# Patient Record
Sex: MALE | Race: WHITE | NOT HISPANIC OR LATINO | Employment: FULL TIME | RURAL
[De-identification: names, ages, dates, MRNs, and addresses within clinical notes are randomized per-mention and may not be internally consistent; named-entity substitution may affect disease eponyms.]

---

## 2021-04-07 ENCOUNTER — HISTORICAL (OUTPATIENT)
Dept: ADMINISTRATIVE | Facility: HOSPITAL | Age: 21
End: 2021-04-07

## 2021-04-07 LAB
ANION GAP SERPL CALCULATED.3IONS-SCNC: 16 MMOL/L
BASOPHILS # BLD AUTO: 0.02 X10E3/UL (ref 0–0.2)
BASOPHILS NFR BLD AUTO: 0.4 % (ref 0–1)
BUN SERPL-MCNC: 13 MG/DL (ref 7–18)
CALCIUM SERPL-MCNC: 9.9 MG/DL (ref 8.5–10.1)
CHLORIDE SERPL-SCNC: 105 MMOL/L (ref 101–111)
CO2 SERPL-SCNC: 25 MMOL/L (ref 21–32)
CREAT SERPL-MCNC: 1 MG/DL (ref 0.6–1.3)
EOSINOPHIL # BLD AUTO: 0.15 X10E3/UL (ref 0–0.5)
EOSINOPHIL NFR BLD AUTO: 2.7 % (ref 1–4)
ERYTHROCYTE [DISTWIDTH] IN BLOOD BY AUTOMATED COUNT: 13 % (ref 11.5–14.5)
GLUCOSE SERPL-MCNC: 111 MG/DL (ref 74–106)
HCT VFR BLD AUTO: 42.3 % (ref 40–54)
HGB BLD-MCNC: 14.7 G/DL (ref 13.5–18)
LYMPHOCYTES # BLD AUTO: 1.94 X10E3/UL (ref 1–4.8)
LYMPHOCYTES NFR BLD AUTO: 34.9 % (ref 27–41)
MCH RBC QN AUTO: 30.6 PG (ref 27–31)
MCHC RBC AUTO-ENTMCNC: 34.8 G/DL (ref 32–36)
MCV RBC AUTO: 88 FL (ref 80–96)
MONOCYTES # BLD AUTO: 0.59 X10E3/UL (ref 0–0.8)
MONOCYTES NFR BLD AUTO: 10.6 % (ref 2–6)
MPC BLD CALC-MCNC: 12.1 FL (ref 9.4–12.4)
NEUTROPHILS # BLD AUTO: 2.86 X10E3/UL (ref 1.8–7.7)
NEUTROPHILS NFR BLD AUTO: 51.4 % (ref 53–65)
PLATELET # BLD AUTO: 189 X10E3/UL (ref 150–400)
POTASSIUM SERPL-SCNC: 4.2 MMOL/L (ref 3.6–5)
RBC # BLD AUTO: 4.81 X10E6/UL (ref 4.6–6.2)
SODIUM SERPL-SCNC: 142 MMOL/L (ref 135–145)
WBC # BLD AUTO: 5.56 X10E3/UL (ref 4.5–11)

## 2022-08-15 ENCOUNTER — OFFICE VISIT (OUTPATIENT)
Dept: FAMILY MEDICINE | Facility: CLINIC | Age: 22
End: 2022-08-15

## 2022-08-15 VITALS
SYSTOLIC BLOOD PRESSURE: 112 MMHG | TEMPERATURE: 98 F | DIASTOLIC BLOOD PRESSURE: 86 MMHG | WEIGHT: 194.81 LBS | HEIGHT: 69 IN | OXYGEN SATURATION: 98 % | RESPIRATION RATE: 18 BRPM | HEART RATE: 76 BPM | BODY MASS INDEX: 28.85 KG/M2

## 2022-08-15 DIAGNOSIS — Z20.822 ENCOUNTER FOR LABORATORY TESTING FOR COVID-19 VIRUS: ICD-10-CM

## 2022-08-15 DIAGNOSIS — J02.9 PHARYNGITIS, UNSPECIFIED ETIOLOGY: ICD-10-CM

## 2022-08-15 DIAGNOSIS — J01.40 ACUTE NON-RECURRENT PANSINUSITIS: Primary | ICD-10-CM

## 2022-08-15 LAB
CTP QC/QA: YES
SARS-COV-2 AG RESP QL IA.RAPID: NEGATIVE

## 2022-08-15 PROCEDURE — 87426 SARS CORONAVIRUS 2 ANTIGEN POCT: ICD-10-PCS | Mod: QW,,, | Performed by: NURSE PRACTITIONER

## 2022-08-15 PROCEDURE — 87426 SARSCOV CORONAVIRUS AG IA: CPT | Mod: QW,,, | Performed by: NURSE PRACTITIONER

## 2022-08-15 PROCEDURE — 96372 THER/PROPH/DIAG INJ SC/IM: CPT | Mod: ,,, | Performed by: NURSE PRACTITIONER

## 2022-08-15 PROCEDURE — 96372 PR INJECTION,THERAP/PROPH/DIAG2ST, IM OR SUBCUT: ICD-10-PCS | Mod: ,,, | Performed by: NURSE PRACTITIONER

## 2022-08-15 PROCEDURE — 99203 PR OFFICE/OUTPT VISIT, NEW, LEVL III, 30-44 MIN: ICD-10-PCS | Mod: 25,,, | Performed by: NURSE PRACTITIONER

## 2022-08-15 PROCEDURE — 99203 OFFICE O/P NEW LOW 30 MIN: CPT | Mod: 25,,, | Performed by: NURSE PRACTITIONER

## 2022-08-15 RX ORDER — CEFTRIAXONE 1 G/1
1 INJECTION, POWDER, FOR SOLUTION INTRAMUSCULAR; INTRAVENOUS
Status: COMPLETED | OUTPATIENT
Start: 2022-08-15 | End: 2022-08-15

## 2022-08-15 RX ORDER — AZITHROMYCIN 250 MG/1
TABLET, FILM COATED ORAL
Qty: 6 TABLET | Refills: 0 | Status: SHIPPED | OUTPATIENT
Start: 2022-08-15 | End: 2022-08-20

## 2022-08-15 RX ORDER — DEXAMETHASONE SODIUM PHOSPHATE 4 MG/ML
4 INJECTION, SOLUTION INTRA-ARTICULAR; INTRALESIONAL; INTRAMUSCULAR; INTRAVENOUS; SOFT TISSUE
Status: COMPLETED | OUTPATIENT
Start: 2022-08-15 | End: 2022-08-15

## 2022-08-15 RX ADMIN — CEFTRIAXONE 1 G: 1 INJECTION, POWDER, FOR SOLUTION INTRAMUSCULAR; INTRAVENOUS at 04:08

## 2022-08-15 RX ADMIN — DEXAMETHASONE SODIUM PHOSPHATE 4 MG: 4 INJECTION, SOLUTION INTRA-ARTICULAR; INTRALESIONAL; INTRAMUSCULAR; INTRAVENOUS; SOFT TISSUE at 04:08

## 2022-08-15 NOTE — ASSESSMENT & PLAN NOTE
COVID/Flu negative. Rocephin and Decadron given IM in clinic.  Zithromax as ordered and Ed a hist as needed.     Reviewed pathology of current symptoms, medication side effects/risk/benefits/directions on taking medications, and worsening or persistent symptoms that require follow up in next 2-3 days. Saline/steroid nasal sprays, antihistamine use, increase fluid intake, and multivitamin/elderberry/Zinc use were recommended. May take Tylenol or Motrin as needed for pain and/or fever. Patient was instructed to take antibiotic as directed, complete entire course to avoid antibiotic resistance, and take OTC probiotic with antibiotic to prevent GI upset. Patient verbalized understanding of treatment plan and denies any questions.

## 2022-08-15 NOTE — PROGRESS NOTES
Crhista Lawler NP   [facility]  [address]  [place]  [address]      PATIENT NAME: Arnie Scott  : 2000  DATE: 8/15/22  MRN: [id number]      Billing Provider: Christa Lawler NP  Level of Service:   Patient PCP Information     Provider PCP Type    Primary Doctor No General          Reason for Visit / Chief Complaint: Headache (Since Thursday), Sore Throat (On and off), and Shortness of Breath (Feels hard to breathe sometimes. Has a lot of pressure on his chest. )       Update PCP  Update Chief Complaint         History of Present Illness / Problem Focused Workflow     Arnie Scott presents to the clinic with Headache (Since Thursday), Sore Throat (On and off), and Shortness of Breath (Feels hard to breathe sometimes. Has a lot of pressure on his chest. )      22 year old male presents to clinic with complaints of headache, sore throat, and what he describes as feeling tight in his chest when he takes a breath which started Thursday. He is accompanied today by his mother. He denies fever and denies known sick contacts. He reports he left work early  due to he was feeling so bad and went to [facility] ER. He was diagnosed with sinusitis and instructed to follow up here for treatment. He states since this time his chest feels better but his head still hurts and his throat is sore.       Review of Systems     @Review of Systems   Constitutional: Negative for activity change, appetite change, fatigue and fever.   HENT: Positive for nasal congestion, sinus pressure/congestion and sore throat. Negative for ear pain and rhinorrhea.    Eyes: Negative for pain, redness, visual disturbance and eye dryness.   Respiratory: Positive for shortness of breath. Negative for cough.    Cardiovascular: Negative for chest pain and leg swelling.   Gastrointestinal: Negative for abdominal distention, abdominal pain, constipation and diarrhea.   Endocrine: Negative for cold intolerance,  heat intolerance and polyuria.   Genitourinary: Negative for bladder incontinence, dysuria, frequency and urgency.   Musculoskeletal: Negative for arthralgias, gait problem and myalgias.   Integumentary:  Negative for color change, rash and wound.   Allergic/Immunologic: Negative for environmental allergies and food allergies.   Neurological: Positive for headaches. Negative for dizziness, weakness and light-headedness.   Psychiatric/Behavioral: Negative for behavioral problems and sleep disturbance.       Medical / Social / Family History     Past Medical History:   Diagnosis Date    ADHD (attention deficit hyperactivity disorder)        Past Surgical History:   Procedure Laterality Date    ABDOMINAL SURGERY      TYMPANOSTOMY TUBE PLACEMENT Bilateral        Social History    reports that he has been smoking cigarettes. He quit smokeless tobacco use about 2 weeks ago.  His smokeless tobacco use included snuff. He reports current alcohol use. He reports that he does not use drugs.    Family History  's family history includes Arthritis in his mother; Asthma in his maternal grandmother; Diabetes in his maternal grandmother; Heart attack in his maternal grandfather; Hyperlipidemia in his father; Hypertension in his maternal grandfather, maternal grandmother, and mother; Liver disease in his mother; No Known Problems in his brother, paternal grandfather, paternal grandmother, and sister; Stroke in his maternal grandmother.    Medications and Allergies     Medications  No outpatient medications have been marked as taking for the 8/15/22 encounter (Office Visit) with Christa Lawler NP.     Current Facility-Administered Medications for the 8/15/22 encounter (Office Visit) with Christa Lawler NP   Medication Dose Route Frequency Provider Last Rate Last Admin    cefTRIAXone injection 1 g  1 g Intramuscular 1 time in Clinic/HOD Christa Lawler NP        dexamethasone injection 4 mg  4 mg Intramuscular 1  time in Clinic/HOD Christa Lawler NP           Allergies  Review of patient's allergies indicates:  No Known Allergies    Physical Examination     Vitals:    08/15/22 1522   BP: 112/86   Pulse: 76   Resp: 18   Temp: 98.3 °F (36.8 °C)     Physical Exam  Vitals and nursing note reviewed.   HENT:      Head: Normocephalic.      Right Ear: Tympanic membrane is bulging.      Left Ear: Tympanic membrane is bulging.      Nose: Congestion and rhinorrhea present. Rhinorrhea is purulent.      Right Sinus: Maxillary sinus tenderness and frontal sinus tenderness present.      Left Sinus: Maxillary sinus tenderness and frontal sinus tenderness present.      Mouth/Throat:      Mouth: Mucous membranes are moist.      Pharynx: Oropharynx is clear. Posterior oropharyngeal erythema present. No oropharyngeal exudate.   Eyes:      Conjunctiva/sclera: Conjunctivae normal.   Cardiovascular:      Rate and Rhythm: Normal rate and regular rhythm.      Heart sounds: Normal heart sounds.   Pulmonary:      Effort: Pulmonary effort is normal.      Breath sounds: Normal breath sounds. No wheezing, rhonchi or rales.   Abdominal:      General: Abdomen is flat. Bowel sounds are normal. There is no distension.      Palpations: Abdomen is soft.   Musculoskeletal:         General: No swelling or tenderness. Normal range of motion.      Cervical back: Normal range of motion.      Right lower leg: No edema.      Left lower leg: No edema.   Skin:     General: Skin is warm and dry.   Neurological:      Mental Status: He is alert. Mental status is at baseline.   Psychiatric:         Mood and Affect: Mood normal.         Behavior: Behavior normal.               Lab Results   Component Value Date    WBC 5.56 04/07/2021    HGB 14.7 04/07/2021    HCT 42.3 04/07/2021    MCV 88 04/07/2021     04/07/2021          Sodium   Date Value Ref Range Status   04/07/2021 142.0 135.0 - 145.0 mmol/L      Potassium   Date Value Ref Range Status   04/07/2021 4.2 3.6  - 5.0 mmol/L      Chloride   Date Value Ref Range Status   04/07/2021 105.0 101.0 - 111.0 mmol/L      CO2   Date Value Ref Range Status   04/07/2021 25 21 - 32 mmol/L      Glucose   Date Value Ref Range Status   04/07/2021 111 (H) 74 - 106 mg/dL      BUN   Date Value Ref Range Status   04/07/2021 13 7 - 18 mg/dL      Creatinine   Date Value Ref Range Status   04/07/2021 1.0 0.6 - 1.3 mg/dL      Calcium   Date Value Ref Range Status   04/07/2021 9.9 8.5 - 10.1 mg/dL      Anion Gap   Date Value Ref Range Status   04/07/2021 16       eGFR    Date Value Ref Range Status   04/07/2021 120       Comment:     The above 11 analytes were performed by Darrian Terry[address] [place] [id number]     eGFR   Date Value Ref Range Status   04/07/2021 99        CT Cervical Spine Without Contrast  Narrative: CT cervical spine    Indication: Neck pain after injury     Comparison: None available    Technique: Axial CT imaging of the cervical spine is performed without  contrast.  Computer reformatting is viewed in the sagittal and coronal  planes.    CT dose reduction technique used - Dose Rite and tube current  modulation.    Findings: No fracture is seen. 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    Vertebral body heights are otherwise within normal limits.  No other  abnormality is demonstrated.  Impression: Impression: No evidence of acute injury demonstrated    This report has been electronically signed by Roger Fry  X-Ray Hand 3 view Right  Narrative: XR HAND RIGHT COMPLETE 3 views     Indication: Pain, history of injury     Comparison: None available    Findings: No evidence of fracture seen.  The alignment of the joints  appears normal.  No degenerative change is present.  No soft tissue  abnormality is seen.  Impression: Impression: No evidence of abnormality  demonstrated    This report has been electronically signed by Roger Fry  CT Head Without Contrast  Narrative: CT HEAD WO CONTRAST    Indication: Head injury, vomiting    Comparison: None available    Technique: Axial CT imaging of the brain is performed without contrast  with 3 mm increments.    CT dose reduction technique used - Dose Rite and tube current  modulation.    Findings: No evidence of hemorrhage, mass, mass effect, midline shift or  acute infarct seen.  The brain parenchyma attenuation and  differentiation appears within normal limits. The ventricles and  cisterns are normal in caliber.  No cranial or skull base abnormality is  identified.    Impression: Impression: No evidence of acute demonstrated.    This report has been electronically signed by Roger Fry     Procedures   Assessment and Plan (including Health Maintenance)      Problem List  Smart Sets  Document Outside HM   :    Plan:           Problem List Items Addressed This Visit        ENT    Acute non-recurrent pansinusitis - Primary    Current Assessment & Plan     COVID/Flu negative. Rocephin and Decadron given IM in clinic.  Zithromax as ordered and Ed a hist as needed.     Reviewed pathology of current symptoms, medication side effects/risk/benefits/directions on taking medications, and worsening or persistent symptoms that require follow up in next 2-3 days. Saline/steroid nasal sprays, antihistamine use, increase fluid intake, and multivitamin/elderberry/Zinc use were recommended. May take Tylenol or Motrin as needed for pain and/or fever. Patient was instructed to take antibiotic as directed, complete entire course to avoid antibiotic resistance, and take OTC probiotic with antibiotic to prevent GI upset. Patient verbalized understanding of treatment plan and denies any questions.             Relevant Orders    SARS Coronavirus 2 Antigen, POCT    Pharyngitis    Current Assessment & Plan     Most likely related to post nasal  drip and will improve with sinusitis.     Symptomatic treatment is recommended. Increase fluids and rest, alternate OTC Tylenol/Motrin for fever/pain, and to follow up if discussed worsening symptoms occur in next 2-3 days for further evaluation. May use warm, salt gargles, honey lemon drops, etc as needed. Patient verbalized understanding of treatment plan and denies any questions.                     Relevant Orders    SARS Coronavirus 2 Antigen, POCT      Other Visit Diagnoses     Encounter for laboratory testing for COVID-19 virus        Relevant Orders    SARS Coronavirus 2 Antigen, POCT          Health Maintenance Topics with due status: Not Due       Topic Last Completion Date    TETANUS VACCINE 09/19/2013    Influenza Vaccine Not Due       No future appointments.     Health Maintenance Due   Topic Date Due    Hepatitis C Screening  Never done    Lipid Panel  Never done    HPV Vaccines (1 - Male 2-dose series) Never done    HIV Screening  Never done        Follow up if symptoms worsen or fail to improve.     Signature:  Christa Lawler NP  [facility]  [address] [address]  [address]    Date of encounter: 8/15/22

## 2022-08-15 NOTE — ASSESSMENT & PLAN NOTE
Most likely related to post nasal drip and will improve with sinusitis.     Symptomatic treatment is recommended. Increase fluids and rest, alternate OTC Tylenol/Motrin for fever/pain, and to follow up if discussed worsening symptoms occur in next 2-3 days for further evaluation. May use warm, salt gargles, honey lemon drops, etc as needed. Patient verbalized understanding of treatment plan and denies any questions.

## 2022-10-18 ENCOUNTER — OFFICE VISIT (OUTPATIENT)
Dept: FAMILY MEDICINE | Facility: CLINIC | Age: 22
End: 2022-10-18

## 2022-10-18 VITALS
RESPIRATION RATE: 18 BRPM | OXYGEN SATURATION: 99 % | SYSTOLIC BLOOD PRESSURE: 106 MMHG | DIASTOLIC BLOOD PRESSURE: 70 MMHG | WEIGHT: 198.81 LBS | HEART RATE: 63 BPM | HEIGHT: 69 IN | BODY MASS INDEX: 29.45 KG/M2 | TEMPERATURE: 97 F

## 2022-10-18 DIAGNOSIS — R53.83 FATIGUE, UNSPECIFIED TYPE: Primary | ICD-10-CM

## 2022-10-18 LAB
ALBUMIN SERPL BCP-MCNC: 4.3 G/DL (ref 3.5–5)
ALBUMIN/GLOB SERPL: 1.3 {RATIO}
ALP SERPL-CCNC: 68 U/L (ref 45–115)
ALT SERPL W P-5'-P-CCNC: 58 U/L (ref 16–61)
ANION GAP SERPL CALCULATED.3IONS-SCNC: 11 MMOL/L (ref 7–16)
AST SERPL W P-5'-P-CCNC: 27 U/L (ref 15–37)
BASOPHILS # BLD AUTO: 0.02 K/UL (ref 0–0.2)
BASOPHILS NFR BLD AUTO: 0.5 % (ref 0–1)
BILIRUB SERPL-MCNC: 0.4 MG/DL (ref ?–1.2)
BUN SERPL-MCNC: 15 MG/DL (ref 7–18)
BUN/CREAT SERPL: 16 (ref 6–20)
CALCIUM SERPL-MCNC: 9.3 MG/DL (ref 8.5–10.1)
CHLORIDE SERPL-SCNC: 104 MMOL/L (ref 98–107)
CO2 SERPL-SCNC: 26 MMOL/L (ref 21–32)
CREAT SERPL-MCNC: 0.92 MG/DL (ref 0.7–1.3)
DIFFERENTIAL METHOD BLD: ABNORMAL
EGFR (NO RACE VARIABLE) (RUSH/TITUS): 120 ML/MIN/1.73M²
EOSINOPHIL # BLD AUTO: 0.15 K/UL (ref 0–0.5)
EOSINOPHIL NFR BLD AUTO: 3.6 % (ref 1–4)
ERYTHROCYTE [DISTWIDTH] IN BLOOD BY AUTOMATED COUNT: 12.7 % (ref 11.5–14.5)
GLOBULIN SER-MCNC: 3.3 G/DL (ref 2–4)
GLUCOSE SERPL-MCNC: 103 MG/DL (ref 74–106)
HCT VFR BLD AUTO: 42.8 % (ref 40–54)
HGB BLD-MCNC: 14.9 G/DL (ref 13.5–18)
IMM GRANULOCYTES # BLD AUTO: 0.03 K/UL (ref 0–0.04)
IMM GRANULOCYTES NFR BLD: 0.7 % (ref 0–0.4)
LYMPHOCYTES # BLD AUTO: 1.62 K/UL (ref 1–4.8)
LYMPHOCYTES NFR BLD AUTO: 38.8 % (ref 27–41)
MCH RBC QN AUTO: 30.7 PG (ref 27–31)
MCHC RBC AUTO-ENTMCNC: 34.8 G/DL (ref 32–36)
MCV RBC AUTO: 88.2 FL (ref 80–96)
MONOCYTES # BLD AUTO: 0.44 K/UL (ref 0–0.8)
MONOCYTES NFR BLD AUTO: 10.6 % (ref 2–6)
MPC BLD CALC-MCNC: 12.5 FL (ref 9.4–12.4)
NEUTROPHILS # BLD AUTO: 1.91 K/UL (ref 1.8–7.7)
NEUTROPHILS NFR BLD AUTO: 45.8 % (ref 53–65)
NRBC # BLD AUTO: 0 X10E3/UL
NRBC, AUTO (.00): 0 %
PLATELET # BLD AUTO: 177 K/UL (ref 150–400)
POTASSIUM SERPL-SCNC: 4.4 MMOL/L (ref 3.5–5.1)
PROT SERPL-MCNC: 7.6 G/DL (ref 6.4–8.2)
RBC # BLD AUTO: 4.85 M/UL (ref 4.6–6.2)
SODIUM SERPL-SCNC: 137 MMOL/L (ref 136–145)
WBC # BLD AUTO: 4.17 K/UL (ref 4.5–11)

## 2022-10-18 PROCEDURE — 85025 COMPLETE CBC W/AUTO DIFF WBC: CPT | Mod: ,,, | Performed by: CLINICAL MEDICAL LABORATORY

## 2022-10-18 PROCEDURE — 99213 OFFICE O/P EST LOW 20 MIN: CPT | Mod: ,,, | Performed by: NURSE PRACTITIONER

## 2022-10-18 PROCEDURE — 85025 CBC WITH DIFFERENTIAL: ICD-10-PCS | Mod: ,,, | Performed by: CLINICAL MEDICAL LABORATORY

## 2022-10-18 PROCEDURE — 80053 COMPREHEN METABOLIC PANEL: CPT | Mod: ,,, | Performed by: CLINICAL MEDICAL LABORATORY

## 2022-10-18 PROCEDURE — 80053 COMPREHENSIVE METABOLIC PANEL: ICD-10-PCS | Mod: ,,, | Performed by: CLINICAL MEDICAL LABORATORY

## 2022-10-18 PROCEDURE — 99213 PR OFFICE/OUTPT VISIT, EST, LEVL III, 20-29 MIN: ICD-10-PCS | Mod: ,,, | Performed by: NURSE PRACTITIONER

## 2022-10-18 NOTE — LETTER
October 18, 2022      [facility]  [address]  [place] [address]  Phone: [phone]  Fax: [phone]       Patient: Arnie Scott   YOB: 2000  Date of Visit: 10/18/2022    To Whom It May Concern:    Ze Scott  was at [facility] on 10/18/2022. The patient may return to work/school on 10/18/22 with no restrictions. Please excuse work absence on 10/17/22. If you have any questions or concerns, or if I can be of further assistance, please do not hesitate to contact me.    Sincerely,    Christa Lawler NP

## 2022-10-18 NOTE — PROGRESS NOTES
Christa Lawler NP   [facility]  [address]  [place]  [address]      PATIENT NAME: Arnie Scott  : 2000  DATE: 10/18/22  MRN: [id number]      Billing Provider: Christa Lawler NP  Level of Service:   Patient PCP Information       Provider PCP Type    Primary Doctor No General            Reason for Visit / Chief Complaint: Shortness of Breath (Pt states he has for about a month had shortness of breath, easy to fatigue, states he has just not felt good for about a month now )       Update PCP  Update Chief Complaint         History of Present Illness / Problem Focused Workflow     Arnie Scott presents to the clinic with Shortness of Breath (Pt states he has for about a month had shortness of breath, easy to fatigue, states he has just not felt good for about a month now )     22 year old male presents to clinic with complaints of shortness of breath. Patient states for about a month he has had shortness of breath, easy to fatigue, states he has just not felt good for about a month now. Wants some lab work done to see if we can figure out what is going on.         Review of Systems     @Review of Systems   Constitutional:  Positive for fatigue. Negative for activity change, appetite change and fever.   HENT:  Negative for nasal congestion, ear pain, rhinorrhea, sinus pressure/congestion and sore throat.    Eyes:  Negative for pain, redness, visual disturbance and eye dryness.   Respiratory:  Positive for shortness of breath. Negative for cough.    Cardiovascular:  Negative for chest pain and leg swelling.   Gastrointestinal:  Negative for abdominal distention, abdominal pain, constipation and diarrhea.   Endocrine: Negative for cold intolerance, heat intolerance and polyuria.   Genitourinary:  Negative for bladder incontinence, dysuria, frequency and urgency.   Musculoskeletal:  Negative for arthralgias, gait problem and myalgias.   Integumentary:  Negative for color change, rash  and wound.   Allergic/Immunologic: Negative for environmental allergies and food allergies.   Neurological:  Negative for dizziness, weakness, light-headedness and headaches.   Psychiatric/Behavioral:  Negative for behavioral problems and sleep disturbance.      Medical / Social / Family History     Past Medical History:   Diagnosis Date    ADHD (attention deficit hyperactivity disorder)        Past Surgical History:   Procedure Laterality Date    ABDOMINAL SURGERY      TYMPANOSTOMY TUBE PLACEMENT Bilateral        Social History    reports that he has quit smoking. His smoking use included cigarettes. He quit smokeless tobacco use about 2 months ago.  His smokeless tobacco use included snuff. He reports current alcohol use. He reports that he does not use drugs.    Family History  's family history includes Arthritis in his mother; Asthma in his maternal grandmother; Diabetes in his maternal grandmother; Heart attack in his maternal grandfather; Hyperlipidemia in his father; Hypertension in his maternal grandfather, maternal grandmother, and mother; Liver disease in his mother; No Known Problems in his brother, paternal grandfather, paternal grandmother, and sister; Stroke in his maternal grandmother.    Medications and Allergies     Medications  No outpatient medications have been marked as taking for the 10/18/22 encounter (Office Visit) with Christa Lawler NP.       Allergies  Review of patient's allergies indicates:  No Known Allergies    Physical Examination     Vitals:    10/18/22 0952   BP: 106/70   Pulse: 63   Resp: 18   Temp: 96.9 °F (36.1 °C)     Physical Exam  Vitals and nursing note reviewed.   HENT:      Head: Normocephalic.      Right Ear: Tympanic membrane normal.      Left Ear: Tympanic membrane normal.      Nose: Nose normal.      Mouth/Throat:      Mouth: Mucous membranes are moist.      Pharynx: Oropharynx is clear. No posterior oropharyngeal erythema.   Eyes:      Conjunctiva/sclera:  Conjunctivae normal.   Cardiovascular:      Rate and Rhythm: Normal rate and regular rhythm.      Heart sounds: Normal heart sounds.   Pulmonary:      Effort: Pulmonary effort is normal.      Breath sounds: Normal breath sounds.   Abdominal:      General: Abdomen is flat. Bowel sounds are normal. There is no distension.      Palpations: Abdomen is soft.   Musculoskeletal:         General: No swelling or tenderness. Normal range of motion.      Cervical back: Normal range of motion.      Right lower leg: No edema.      Left lower leg: No edema.   Skin:     General: Skin is warm and dry.   Neurological:      Mental Status: He is alert. Mental status is at baseline.   Psychiatric:         Mood and Affect: Mood normal.         Behavior: Behavior normal.             Lab Results   Component Value Date    WBC 4.17 (L) 10/18/2022    HGB 14.9 10/18/2022    HCT 42.8 10/18/2022    MCV 88.2 10/18/2022     10/18/2022          Sodium   Date Value Ref Range Status   10/18/2022 137 136 - 145 mmol/L Final     Potassium   Date Value Ref Range Status   10/18/2022 4.4 3.5 - 5.1 mmol/L Final     Chloride   Date Value Ref Range Status   10/18/2022 104 98 - 107 mmol/L Final     CO2   Date Value Ref Range Status   10/18/2022 26 21 - 32 mmol/L Final     Glucose   Date Value Ref Range Status   10/18/2022 103 74 - 106 mg/dL Final     BUN   Date Value Ref Range Status   10/18/2022 15 7 - 18 mg/dL Final     Creatinine   Date Value Ref Range Status   10/18/2022 0.92 0.70 - 1.30 mg/dL Final     Calcium   Date Value Ref Range Status   10/18/2022 9.3 8.5 - 10.1 mg/dL Final     Total Protein   Date Value Ref Range Status   10/18/2022 7.6 6.4 - 8.2 g/dL Final     Albumin   Date Value Ref Range Status   10/18/2022 4.3 3.5 - 5.0 g/dL Final     Bilirubin, Total   Date Value Ref Range Status   10/18/2022 0.4 >0.0 - 1.2 mg/dL Final     Alk Phos   Date Value Ref Range Status   10/18/2022 68 45 - 115 U/L Final     AST   Date Value Ref Range Status    10/18/2022 27 15 - 37 U/L Final     ALT   Date Value Ref Range Status   10/18/2022 58 16 - 61 U/L Final     Anion Gap   Date Value Ref Range Status   10/18/2022 11 7 - 16 mmol/L Final     eGFR    Date Value Ref Range Status   04/07/2021 120       Comment:     The above 11 analytes were performed by Darrian [id number] [place] [address]     eGFR   Date Value Ref Range Status   04/07/2021 99        CT Cervical Spine Without Contrast  Narrative: CT cervical spine    Indication: Neck pain after injury     Comparison: None available    Technique: Axial CT imaging of the cervical spine is performed without  contrast.  Computer reformatting is viewed in the sagittal and coronal  planes.    CT dose reduction technique used - Dose Rite and tube current  modulation.    Findings: No fracture is seen. 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    Vertebral body heights are otherwise within normal limits.  No other  abnormality is demonstrated.  Impression: Impression: No evidence of acute injury demonstrated    This report has been electronically signed by Roger Fry  X-Ray Hand 3 view Right  Narrative: XR HAND RIGHT COMPLETE 3 views     Indication: Pain, history of injury     Comparison: None available    Findings: No evidence of fracture seen.  The alignment of the joints  appears normal.  No degenerative change is present.  No soft tissue  abnormality is seen.  Impression: Impression: No evidence of abnormality demonstrated    This report has been electronically signed by Roger Fry  CT Head Without Contrast  Narrative: CT HEAD WO CONTRAST    Indication: Head injury, vomiting    Comparison: None available    Technique: Axial CT imaging of the brain is performed without contrast  with 3 mm increments.    CT dose reduction technique used - Dose Rite and tube  current  modulation.    Findings: No evidence of hemorrhage, mass, mass effect, midline shift or  acute infarct seen.  The brain parenchyma attenuation and  differentiation appears within normal limits. The ventricles and  cisterns are normal in caliber.  No cranial or skull base abnormality is  identified.    Impression: Impression: No evidence of acute demonstrated.    This report has been electronically signed by Roger Fry     Procedures   Assessment and Plan (including Health Maintenance)      Problem List  Smart Sets  Document Outside HM   :    Plan:           Problem List Items Addressed This Visit          Other    Fatigue - Primary    Current Assessment & Plan     CBC and CMP obtained at today's visit. Will follow up with labs.   Instructed patient to make sure he is getting plenty of sleep at night. Avoid using caffeine before bed. Turn off TV, phone and electronics while trying to rest.   Follow up as needed.           Relevant Orders    CBC Auto Differential (Completed)    Comprehensive Metabolic Panel (Completed)       Health Maintenance Topics with due status: Not Due       Topic Last Completion Date    TETANUS VACCINE 09/19/2013       Future Appointments   Date Time Provider Department Center   10/24/2022  9:30 AM Christa Lawler NP [facility]        Health Maintenance Due   Topic Date Due    Hepatitis C Screening  Never done    Lipid Panel  Never done        No follow-ups on file.     Signature:  Christa Lawler NP  [address] [place]  [address]    Date of encounter: 10/18/22

## 2022-10-19 NOTE — PROGRESS NOTES
Labs reviewed: CBC and BMP normal or clinically insignificant. If symptoms continue follow up for further diagnostic testing.

## 2022-10-23 PROBLEM — R53.83 FATIGUE: Status: ACTIVE | Noted: 2022-10-23

## 2022-10-23 NOTE — ASSESSMENT & PLAN NOTE
CBC and CMP obtained at today's visit. Will follow up with labs.   Instructed patient to make sure he is getting plenty of sleep at night. Avoid using caffeine before bed. Turn off TV, phone and electronics while trying to rest.   Follow up as needed.

## 2022-11-14 PROBLEM — J01.40 ACUTE NON-RECURRENT PANSINUSITIS: Status: RESOLVED | Noted: 2022-08-15 | Resolved: 2022-11-14

## 2022-11-29 ENCOUNTER — HOSPITAL ENCOUNTER (EMERGENCY)
Facility: HOSPITAL | Age: 22
Discharge: HOME | End: 2022-11-29
Attending: FAMILY MEDICINE

## 2022-11-29 VITALS
BODY MASS INDEX: 31.39 KG/M2 | HEIGHT: 67 IN | RESPIRATION RATE: 14 BRPM | WEIGHT: 200 LBS | TEMPERATURE: 98 F | OXYGEN SATURATION: 98 % | HEART RATE: 68 BPM | DIASTOLIC BLOOD PRESSURE: 66 MMHG | SYSTOLIC BLOOD PRESSURE: 121 MMHG

## 2022-11-29 DIAGNOSIS — R10.13 EPIGASTRIC PAIN: Primary | ICD-10-CM

## 2022-11-29 LAB
ALBUMIN SERPL BCP-MCNC: 4.4 G/DL (ref 3.5–5)
ALBUMIN/GLOB SERPL: 1.3 {RATIO}
ALP SERPL-CCNC: 63 U/L (ref 45–115)
ALT SERPL W P-5'-P-CCNC: 43 U/L (ref 16–61)
AMYLASE SERPL-CCNC: 57 U/L (ref 25–115)
ANION GAP SERPL CALCULATED.3IONS-SCNC: 9 MMOL/L (ref 7–16)
AST SERPL W P-5'-P-CCNC: 18 U/L (ref 15–37)
BACTERIA #/AREA URNS HPF: ABNORMAL /HPF
BASOPHILS # BLD AUTO: 0.03 K/UL (ref 0–0.2)
BASOPHILS NFR BLD AUTO: 0.7 % (ref 0–1)
BILIRUB SERPL-MCNC: 0.5 MG/DL (ref ?–1.2)
BILIRUB UR QL STRIP: NEGATIVE
BUN SERPL-MCNC: 12 MG/DL (ref 7–18)
BUN/CREAT SERPL: 13 (ref 6–20)
CALCIUM SERPL-MCNC: 9.7 MG/DL (ref 8.5–10.1)
CHLORIDE SERPL-SCNC: 103 MMOL/L (ref 98–107)
CLARITY UR: CLEAR
CO2 SERPL-SCNC: 34 MMOL/L (ref 21–32)
COLOR UR: YELLOW
CREAT SERPL-MCNC: 0.96 MG/DL (ref 0.7–1.3)
DIFFERENTIAL METHOD BLD: ABNORMAL
EGFR (NO RACE VARIABLE) (RUSH/TITUS): 114 ML/MIN/1.73M²
EOSINOPHIL # BLD AUTO: 0.1 K/UL (ref 0–0.5)
EOSINOPHIL NFR BLD AUTO: 2.3 % (ref 1–4)
ERYTHROCYTE [DISTWIDTH] IN BLOOD BY AUTOMATED COUNT: 12.5 % (ref 11.5–14.5)
GLOBULIN SER-MCNC: 3.5 G/DL (ref 2–4)
GLUCOSE SERPL-MCNC: 100 MG/DL (ref 74–106)
GLUCOSE UR STRIP-MCNC: NEGATIVE MG/DL
HCT VFR BLD AUTO: 42 % (ref 40–54)
HGB BLD-MCNC: 14.8 G/DL (ref 13.5–18)
KETONES UR STRIP-SCNC: NEGATIVE MG/DL
LEUKOCYTE ESTERASE UR QL STRIP: NEGATIVE
LIPASE SERPL-CCNC: 112 U/L (ref 73–393)
LYMPHOCYTES # BLD AUTO: 1.4 K/UL (ref 1–4.8)
LYMPHOCYTES NFR BLD AUTO: 32 % (ref 27–41)
MCH RBC QN AUTO: 30.6 PG (ref 27–31)
MCHC RBC AUTO-ENTMCNC: 35.2 G/DL (ref 32–36)
MCV RBC AUTO: 86.8 FL (ref 80–96)
MONOCYTES # BLD AUTO: 0.46 K/UL (ref 0–0.8)
MONOCYTES NFR BLD AUTO: 10.5 % (ref 2–6)
MPC BLD CALC-MCNC: 11 FL (ref 9.4–12.4)
NEUTROPHILS # BLD AUTO: 2.38 K/UL (ref 1.8–7.7)
NEUTROPHILS NFR BLD AUTO: 54.5 % (ref 53–65)
NITRITE UR QL STRIP: NEGATIVE
PH UR STRIP: 7 PH UNITS
PLATELET # BLD AUTO: 175 K/UL (ref 150–400)
POTASSIUM SERPL-SCNC: 4.3 MMOL/L (ref 3.5–5.1)
PROT SERPL-MCNC: 7.9 G/DL (ref 6.4–8.2)
PROT UR QL STRIP: 30
RBC # BLD AUTO: 4.84 M/UL (ref 4.6–6.2)
RBC # UR STRIP: NEGATIVE /UL
RBC #/AREA URNS HPF: ABNORMAL /HPF
SODIUM SERPL-SCNC: 142 MMOL/L (ref 136–145)
SP GR UR STRIP: 1.02
SQUAMOUS #/AREA URNS LPF: ABNORMAL /LPF
UROBILINOGEN UR STRIP-ACNC: 0.2 MG/DL
WBC # BLD AUTO: 4.37 K/UL (ref 4.5–11)
WBC #/AREA URNS HPF: ABNORMAL /HPF

## 2022-11-29 PROCEDURE — 36415 COLL VENOUS BLD VENIPUNCTURE: CPT | Performed by: FAMILY MEDICINE

## 2022-11-29 PROCEDURE — 99283 EMERGENCY DEPT VISIT LOW MDM: CPT | Mod: ,,, | Performed by: FAMILY MEDICINE

## 2022-11-29 PROCEDURE — 83690 ASSAY OF LIPASE: CPT | Performed by: FAMILY MEDICINE

## 2022-11-29 PROCEDURE — 85025 COMPLETE CBC W/AUTO DIFF WBC: CPT | Performed by: FAMILY MEDICINE

## 2022-11-29 PROCEDURE — 81003 URINALYSIS AUTO W/O SCOPE: CPT | Performed by: FAMILY MEDICINE

## 2022-11-29 PROCEDURE — 81001 URINALYSIS AUTO W/SCOPE: CPT | Performed by: FAMILY MEDICINE

## 2022-11-29 PROCEDURE — 80053 COMPREHEN METABOLIC PANEL: CPT | Performed by: FAMILY MEDICINE

## 2022-11-29 PROCEDURE — 25000003 PHARM REV CODE 250: Performed by: FAMILY MEDICINE

## 2022-11-29 PROCEDURE — 99284 EMERGENCY DEPT VISIT MOD MDM: CPT | Mod: 25

## 2022-11-29 PROCEDURE — 99283 PR EMERGENCY DEPT VISIT,LEVEL III: ICD-10-PCS | Mod: ,,, | Performed by: FAMILY MEDICINE

## 2022-11-29 PROCEDURE — 82150 ASSAY OF AMYLASE: CPT | Performed by: FAMILY MEDICINE

## 2022-11-29 PROCEDURE — 36000 PLACE NEEDLE IN VEIN: CPT

## 2022-11-29 RX ORDER — ONDANSETRON 4 MG/1
4 TABLET, ORALLY DISINTEGRATING ORAL
Status: COMPLETED | OUTPATIENT
Start: 2022-11-29 | End: 2022-11-29

## 2022-11-29 RX ORDER — LIDOCAINE HYDROCHLORIDE 20 MG/ML
15 SOLUTION OROPHARYNGEAL ONCE
Status: COMPLETED | OUTPATIENT
Start: 2022-11-29 | End: 2022-11-29

## 2022-11-29 RX ORDER — MAG HYDROX/ALUMINUM HYD/SIMETH 200-200-20
30 SUSPENSION, ORAL (FINAL DOSE FORM) ORAL ONCE
Status: COMPLETED | OUTPATIENT
Start: 2022-11-29 | End: 2022-11-29

## 2022-11-29 RX ADMIN — ONDANSETRON 4 MG: 4 TABLET, ORALLY DISINTEGRATING ORAL at 01:11

## 2022-11-29 RX ADMIN — LIDOCAINE HYDROCHLORIDE 15 ML: 20 SOLUTION ORAL at 02:11

## 2022-11-29 RX ADMIN — ALUMINUM HYDROXIDE, MAGNESIUM HYDROXIDE, AND SIMETHICONE 30 ML: 200; 200; 20 SUSPENSION ORAL at 02:11

## 2022-11-29 NOTE — Clinical Note
"Arnie"Thomas Scott was seen and treated in our emergency department on 11/29/2022.  He may return to work on 11/30/2022.       If you have any questions or concerns, please don't hesitate to call.      Sarina Clemons MD"

## 2022-11-29 NOTE — ED TRIAGE NOTES
22 year old male presents to ed with c/o mid upper abdominal pain x3 days that is constant and achy.   Patient states pain is worse when he is up moving around.   C/o diarrhea on 11/28/2022

## 2022-11-29 NOTE — ED PROVIDER NOTES
Encounter Date: 2022       History     Chief Complaint   Patient presents with    Abdominal Pain    Diarrhea     PT REPORTS ABDOMINAL PAIN STARTING LAST NIGHT. REPORTS HAS HAD A FEW EPISODES OF DIARRHEA. NO N/V. PASSING GAS OK. HX OF ESOPHAGEAL SURGERY AS A . NO HX OF ISSUES SINCE THEN.     The history is provided by the patient.   Review of patient's allergies indicates:  No Known Allergies  Past Medical History:   Diagnosis Date    ADHD (attention deficit hyperactivity disorder)      Past Surgical History:   Procedure Laterality Date    ABDOMINAL SURGERY      TYMPANOSTOMY TUBE PLACEMENT Bilateral      Family History   Problem Relation Age of Onset    Hypertension Mother     Liver disease Mother         fatty liver    Arthritis Mother     Hyperlipidemia Father     No Known Problems Sister     No Known Problems Brother     Diabetes Maternal Grandmother     Stroke Maternal Grandmother     Hypertension Maternal Grandmother     Asthma Maternal Grandmother     Heart attack Maternal Grandfather     Hypertension Maternal Grandfather     No Known Problems Paternal Grandmother     No Known Problems Paternal Grandfather      Social History     Tobacco Use    Smoking status: Former     Types: Cigarettes    Smokeless tobacco: Former     Types: Snuff     Quit date: 2022    Tobacco comments:     every now and then   Substance Use Topics    Alcohol use: Yes     Comment: weekends    Drug use: Never     Review of Systems   Constitutional:  Positive for fatigue.   Gastrointestinal:  Positive for abdominal pain and diarrhea. Negative for nausea and vomiting.   All other systems reviewed and are negative.    Physical Exam     Initial Vitals [22 1215]   BP Pulse Resp Temp SpO2   129/78 68 18 98.2 °F (36.8 °C) 99 %      MAP       --         Physical Exam    Constitutional: He appears well-developed and well-nourished.   HENT:   Head: Normocephalic and atraumatic.   Eyes: EOM are normal. Pupils are equal, round, and  reactive to light.   Neck: Neck supple.   Normal range of motion.  Cardiovascular:  Normal rate and regular rhythm.           Pulmonary/Chest: Breath sounds normal.   Abdominal: Abdomen is soft.   Musculoskeletal:         General: Normal range of motion.      Cervical back: Normal range of motion and neck supple.     Neurological: He is alert and oriented to person, place, and time.   Skin: Skin is warm.   Psychiatric: He has a normal mood and affect. His behavior is normal. Judgment and thought content normal.       Medical Screening Exam   See Full Note    ED Course   Procedures  Labs Reviewed   COMPREHENSIVE METABOLIC PANEL - Abnormal; Notable for the following components:       Result Value    CO2 34 (*)     All other components within normal limits   URINALYSIS, REFLEX TO URINE CULTURE - Abnormal; Notable for the following components:    Protein, UA 30 (*)     All other components within normal limits   CBC WITH DIFFERENTIAL - Abnormal; Notable for the following components:    WBC 4.37 (*)     Monocytes % 10.5 (*)     All other components within normal limits   URINALYSIS, MICROSCOPIC - Abnormal; Notable for the following components:    RBC, UA 10-15 (*)     All other components within normal limits   AMYLASE - Normal   LIPASE - Normal   CBC W/ AUTO DIFFERENTIAL    Narrative:     The following orders were created for panel order CBC auto differential.  Procedure                               Abnormality         Status                     ---------                               -----------         ------                     CBC with Differential[[id number]]        Abnormal            Final result                 Please view results for these tests on the individual orders.          Imaging Results              X-Ray KUB (Final result)  Result time 11/29/22 12:58:42      Final result by Roegr Fry II, MD (11/29/22 12:58:42)                   Impression:      No evidence of abnormality  demonstrated      Electronically signed by: Roger Fry  Date:    11/29/2022  Time:    12:58               Narrative:    EXAMINATION:  XR KUB    CLINICAL HISTORY:  Abdominal pain  abdominal pain;    COMPARISON:  None available    FINDINGS:  No free fluid or free air seen.  The bowel gas pattern appears within normal limits.  No abnormal calcifications are present.  No other abnormality is identified.                                       Medications   ondansetron disintegrating tablet 4 mg (4 mg Oral Given 11/29/22 1344)   aluminum-magnesium hydroxide-simethicone 200-200-20 mg/5 mL suspension 30 mL (30 mLs Oral Given 11/29/22 1411)     And   LIDOcaine HCl 2% oral solution 15 mL (15 mLs Oral Given 11/29/22 1411)                       Clinical Impression:   Final diagnoses:  [R10.13] Epigastric pain (Primary)      ED Disposition Condition    Discharge Stable          ED Prescriptions       Medication Sig Dispense Start Date End Date Auth. Provider    diphenhydrAMINE-aluminum-magnesium hydroxide-simethicone-LIDOcaine HCl 2% Swish and swallow 15 mLs every 6 (six) hours as needed (ABDOMINAL PAIN). 300 each 11/29/2022 12/4/2022 Sarina Clemons MD          Follow-up Information    None          Sarina Clemons MD  11/29/22 1418       Sarina Clemons MD  11/29/22 9987

## 2022-11-30 ENCOUNTER — TELEPHONE (OUTPATIENT)
Dept: EMERGENCY MEDICINE | Facility: HOSPITAL | Age: 22
End: 2022-11-30

## 2023-04-11 ENCOUNTER — LAB VISIT (OUTPATIENT)
Dept: PRIMARY CARE CLINIC | Facility: CLINIC | Age: 23
End: 2023-04-11

## 2023-04-11 DIAGNOSIS — Z02.83 ENCOUNTER FOR DRUG SCREENING: Primary | ICD-10-CM

## 2023-04-11 PROCEDURE — 99000 SPECIMEN HANDLING OFFICE-LAB: CPT | Mod: ,,, | Performed by: NURSE PRACTITIONER

## 2023-04-11 PROCEDURE — 99000 PR SPECIMEN HANDLING,DR OFF->LAB: ICD-10-PCS | Mod: ,,, | Performed by: NURSE PRACTITIONER

## 2023-04-11 NOTE — PROGRESS NOTES
Subjective     Patient ID: Arnie Scott is a 23 y.o. male.    Chief Complaint: No chief complaint on file.    HPI  Review of Systems       Objective     Physical Exam       Assessment and Plan     Problem List Items Addressed This Visit    None  Visit Diagnoses       Encounter for drug screening    -  Primary            Drug testing only

## 2023-05-20 ENCOUNTER — OFFICE VISIT (OUTPATIENT)
Dept: FAMILY MEDICINE | Facility: CLINIC | Age: 23
End: 2023-05-20

## 2023-05-20 VITALS
WEIGHT: 200 LBS | OXYGEN SATURATION: 98 % | HEIGHT: 67 IN | RESPIRATION RATE: 17 BRPM | HEART RATE: 73 BPM | TEMPERATURE: 98 F | SYSTOLIC BLOOD PRESSURE: 124 MMHG | DIASTOLIC BLOOD PRESSURE: 78 MMHG | BODY MASS INDEX: 31.39 KG/M2

## 2023-05-20 DIAGNOSIS — J03.80 ACUTE BACTERIAL TONSILLITIS: Primary | ICD-10-CM

## 2023-05-20 DIAGNOSIS — B96.89 ACUTE BACTERIAL TONSILLITIS: Primary | ICD-10-CM

## 2023-05-20 DIAGNOSIS — J02.9 PHARYNGITIS, UNSPECIFIED ETIOLOGY: ICD-10-CM

## 2023-05-20 LAB
CTP QC/QA: YES
S PYO RRNA THROAT QL PROBE: NEGATIVE

## 2023-05-20 PROCEDURE — 99051 MED SERV EVE/WKEND/HOLIDAY: CPT | Mod: ,,, | Performed by: NURSE PRACTITIONER

## 2023-05-20 PROCEDURE — 87880 POCT RAPID STREP A: ICD-10-PCS | Mod: QW,,, | Performed by: NURSE PRACTITIONER

## 2023-05-20 PROCEDURE — 99213 OFFICE O/P EST LOW 20 MIN: CPT | Mod: ,,, | Performed by: NURSE PRACTITIONER

## 2023-05-20 PROCEDURE — 87880 STREP A ASSAY W/OPTIC: CPT | Mod: QW,,, | Performed by: NURSE PRACTITIONER

## 2023-05-20 PROCEDURE — 99213 PR OFFICE/OUTPT VISIT, EST, LEVL III, 20-29 MIN: ICD-10-PCS | Mod: ,,, | Performed by: NURSE PRACTITIONER

## 2023-05-20 PROCEDURE — 99051 PR MEDICAL SERVICES, EVE/WKEND/HOLIDAY: ICD-10-PCS | Mod: ,,, | Performed by: NURSE PRACTITIONER

## 2023-05-20 RX ORDER — AMOXICILLIN 500 MG/1
500 TABLET, FILM COATED ORAL EVERY 12 HOURS
Qty: 20 TABLET | Refills: 0 | Status: SHIPPED | OUTPATIENT
Start: 2023-05-20 | End: 2023-05-30

## 2023-05-20 NOTE — LETTER
May 20, 2023      [facility]  [address] [facility] [address]  Phone: [phone]  Fax: [phone]       Patient: Arnie Scott   YOB: 2000  Date of Visit: 05/20/2023    To Whom It May Concern:    Ze Scott  was at [facility] on 05/20/2023. The patient may return to work on 05/22/2023 with no restrictions. If you have any questions or concerns, or if I can be of further assistance, please do not hesitate to contact me.    Sincerely,    ISH Prescott

## 2023-05-20 NOTE — PROGRESS NOTES
"Subjective:       Patient ID: Arnie Scott is a 23 y.o. male.    Vitals:  height is 5' 7" (1.702 m) and weight is 90.7 kg (200 lb). His temperature is 97.8 °F (36.6 °C). His blood pressure is 124/78 and his pulse is 73. His respiration is 17 and oxygen saturation is 98%.     Chief Complaint: Abdominal Pain (One week of UKO ), Chest Pain (States chest pain for one week of UKO, present daily on left side no OTC medications, requesting to be tested for HIV and possible fear of mouth cancer ), and tonngue pain (Tongue pain of UKO states with possible mouth ulcers )    KRIS is a 22 y/o WM who presents today for multiple complaints. He states that his tongue has been "looking weird" for about 5 days. He has concerns about HIV and other communicable diseases. He has not been sexually active in about 3-4 months, but his concerned that his previous partner was not honest about her status. She is currently pregnant.     Abdominal Pain  Associated symptoms include nausea. Pertinent negatives include no constipation, dysuria, fever, hematochezia or vomiting.   Chest Pain   Associated symptoms include abdominal pain (x5 days) and nausea. Pertinent negatives include no cough, fever or vomiting.     Constitution: Positive for fatigue. Negative for appetite change and fever.   HENT:  Positive for tongue lesion (x5 days) and congestion (nasal). Negative for sore throat and trouble swallowing.    Cardiovascular:  Positive for chest pain.   Respiratory:  Negative for cough.    Gastrointestinal:  Positive for abdominal pain (x5 days) and nausea. Negative for vomiting, constipation and bright red blood in stool.   Genitourinary:  Negative for dysuria and urgency.   Allergic/Immunologic: Negative for environmental allergies, seasonal allergies and recurrent sinus infections.     Objective:      Physical Exam  Vitals reviewed.   Constitutional:       Appearance: Normal appearance.   HENT:      Head: Normocephalic and atraumatic.      " Right Ear: Hearing normal. No middle ear effusion. Tympanic membrane is not erythematous or bulging.      Left Ear: Hearing normal.  No middle ear effusion. Tympanic membrane is not erythematous or bulging.      Nose: Nose normal. No congestion or rhinorrhea.      Right Turbinates: Not swollen.      Left Turbinates: Not swollen.      Mouth/Throat:      Mouth: Mucous membranes are moist.      Pharynx: Pharyngeal swelling and posterior oropharyngeal erythema present.      Tonsils: No tonsillar exudate. 2+ on the right. 2+ on the left.   Eyes:      Conjunctiva/sclera: Conjunctivae normal.   Cardiovascular:      Rate and Rhythm: Normal rate and regular rhythm.   Pulmonary:      Effort: No respiratory distress.      Breath sounds: Normal breath sounds.   Neurological:      Mental Status: He is alert and oriented to person, place, and time.   Psychiatric:         Mood and Affect: Mood normal.         Behavior: Behavior normal.         Thought Content: Thought content normal.         Judgment: Judgment normal.          Assessment:       1. Acute bacterial tonsillitis    2. Pharyngitis, unspecified etiology        Recent Results (from the past 168 hour(s))   POCT rapid strep A    Collection Time: 05/20/23 11:20 AM   Result Value Ref Range    Rapid Strep A Screen Negative Negative     Acceptable Yes       Plan:       -Throw toothbrush away in 2 days and replaced with new one.   -May go to the local health department for HIV testing.    -When insurance is effective schedule wellness visit.    Acute bacterial tonsillitis  -     amoxicillin (AMOXIL) 500 MG Tab; Take 1 tablet (500 mg total) by mouth every 12 (twelve) hours. for 10 days  Dispense: 20 tablet; Refill: 0    Pharyngitis, unspecified etiology  -     POCT rapid strep A         An After Visit Summary was printed and given to the patient.     Josefa Scott, DNP, APRN-BC  Family Nurse Practitioner    [facility]  [address] [place] [address]

## 2023-05-20 NOTE — PATIENT INSTRUCTIONS
-Throw toothbrush away in 2 days and replaced with new one.   -May go to the local health department for HIV testing.    -When insurance is effective schedule wellness visit.

## 2023-07-19 ENCOUNTER — LAB VISIT (OUTPATIENT)
Dept: PRIMARY CARE CLINIC | Facility: CLINIC | Age: 23
End: 2023-07-19

## 2023-07-19 DIAGNOSIS — Z02.83 ENCOUNTER FOR DRUG SCREENING: Primary | ICD-10-CM

## 2023-07-19 PROCEDURE — 99000 PR SPECIMEN HANDLING,DR OFF->LAB: ICD-10-PCS | Mod: ,,, | Performed by: NURSE PRACTITIONER

## 2023-07-19 PROCEDURE — 99000 SPECIMEN HANDLING OFFICE-LAB: CPT | Mod: ,,, | Performed by: NURSE PRACTITIONER

## 2023-07-19 NOTE — PROGRESS NOTES
Subjective     Patient ID: Arnie Scott is a 23 y.o. male.    Chief Complaint: No chief complaint on file.    HPI  Review of Systems       Objective     Physical Exam       Assessment and Plan     1. Encounter for drug screening        Drug testing only           No follow-ups on file.

## 2023-09-28 ENCOUNTER — LAB VISIT (OUTPATIENT)
Dept: PRIMARY CARE CLINIC | Facility: CLINIC | Age: 23
End: 2023-09-28

## 2023-09-28 DIAGNOSIS — Z02.83 ENCOUNTER FOR DRUG SCREENING: Primary | ICD-10-CM

## 2023-09-28 PROCEDURE — 99000 PR SPECIMEN HANDLING,DR OFF->LAB: ICD-10-PCS | Mod: ,,, | Performed by: NURSE PRACTITIONER

## 2023-09-28 PROCEDURE — 99000 SPECIMEN HANDLING OFFICE-LAB: CPT | Mod: ,,, | Performed by: NURSE PRACTITIONER

## 2025-02-24 ENCOUNTER — HOSPITAL ENCOUNTER (EMERGENCY)
Facility: HOSPITAL | Age: 25
Discharge: HOME | End: 2025-02-24
Payer: COMMERCIAL

## 2025-02-24 VITALS
BODY MASS INDEX: 32.96 KG/M2 | WEIGHT: 210 LBS | HEART RATE: 107 BPM | DIASTOLIC BLOOD PRESSURE: 73 MMHG | HEIGHT: 67 IN | TEMPERATURE: 98 F | OXYGEN SATURATION: 99 % | RESPIRATION RATE: 20 BRPM | SYSTOLIC BLOOD PRESSURE: 147 MMHG

## 2025-02-24 DIAGNOSIS — S81.012A LACERATION OF LEFT KNEE, INITIAL ENCOUNTER: Primary | ICD-10-CM

## 2025-02-24 DIAGNOSIS — S81.012A LACERATION OF LEFT KNEE: ICD-10-CM

## 2025-02-24 PROCEDURE — 99284 EMERGENCY DEPT VISIT MOD MDM: CPT | Mod: 25

## 2025-02-24 PROCEDURE — 63600175 PHARM REV CODE 636 W HCPCS: Performed by: NURSE PRACTITIONER

## 2025-02-24 PROCEDURE — 90715 TDAP VACCINE 7 YRS/> IM: CPT | Performed by: NURSE PRACTITIONER

## 2025-02-24 PROCEDURE — 99284 EMERGENCY DEPT VISIT MOD MDM: CPT | Mod: GF,25,, | Performed by: NURSE PRACTITIONER

## 2025-02-24 PROCEDURE — 12032 INTMD RPR S/A/T/EXT 2.6-7.5: CPT

## 2025-02-24 PROCEDURE — 90471 IMMUNIZATION ADMIN: CPT | Performed by: NURSE PRACTITIONER

## 2025-02-24 PROCEDURE — 12002 RPR S/N/AX/GEN/TRNK2.6-7.5CM: CPT | Mod: ,,, | Performed by: NURSE PRACTITIONER

## 2025-02-24 PROCEDURE — 96372 THER/PROPH/DIAG INJ SC/IM: CPT | Performed by: NURSE PRACTITIONER

## 2025-02-24 RX ORDER — LIDOCAINE HYDROCHLORIDE 10 MG/ML
10 INJECTION, SOLUTION INFILTRATION; PERINEURAL
Status: COMPLETED | OUTPATIENT
Start: 2025-02-24 | End: 2025-02-24

## 2025-02-24 RX ORDER — CEPHALEXIN 500 MG/1
500 CAPSULE ORAL EVERY 12 HOURS
Qty: 14 CAPSULE | Refills: 0 | Status: SHIPPED | OUTPATIENT
Start: 2025-02-24 | End: 2025-03-03

## 2025-02-24 RX ORDER — MORPHINE SULFATE 4 MG/ML
4 INJECTION, SOLUTION INTRAMUSCULAR; INTRAVENOUS
Status: COMPLETED | OUTPATIENT
Start: 2025-02-24 | End: 2025-02-24

## 2025-02-24 RX ORDER — ONDANSETRON HYDROCHLORIDE 2 MG/ML
4 INJECTION, SOLUTION INTRAVENOUS
Status: COMPLETED | OUTPATIENT
Start: 2025-02-24 | End: 2025-02-24

## 2025-02-24 RX ADMIN — MORPHINE SULFATE 4 MG: 4 INJECTION, SOLUTION INTRAMUSCULAR; INTRAVENOUS at 04:02

## 2025-02-24 RX ADMIN — ONDANSETRON 4 MG: 2 INJECTION INTRAMUSCULAR; INTRAVENOUS at 04:02

## 2025-02-24 RX ADMIN — LIDOCAINE HYDROCHLORIDE 10 ML: 10 INJECTION, SOLUTION INFILTRATION; PERINEURAL at 04:02

## 2025-02-24 RX ADMIN — CLOSTRIDIUM TETANI TOXOID ANTIGEN (FORMALDEHYDE INACTIVATED), CORYNEBACTERIUM DIPHTHERIAE TOXOID ANTIGEN (FORMALDEHYDE INACTIVATED), BORDETELLA PERTUSSIS TOXOID ANTIGEN (GLUTARALDEHYDE INACTIVATED), BORDETELLA PERTUSSIS FILAMENTOUS HEMAGGLUTININ ANTIGEN (FORMALDEHYDE INACTIVATED), BORDETELLA PERTUSSIS PERTACTIN ANTIGEN, AND BORDETELLA PERTUSSIS FIMBRIAE 2/3 ANTIGEN 0.5 ML: 5; 2; 2.5; 5; 3; 5 INJECTION, SUSPENSION INTRAMUSCULAR at 03:02

## 2025-02-24 NOTE — Clinical Note
"Arnie Scott ([place]) was seen and treated in our emergency department on 2/24/2025.  He may return to work on 02/27/2025.       If you have any questions or concerns, please don't hesitate to call.      Josefa Scott, RANDYP"

## 2025-02-24 NOTE — ED PROVIDER NOTES
Encounter Date: 2/24/2025       History     Chief Complaint   Patient presents with    Laceration     Patient Identification  Arnie Scott is a 24 y.o. male.    Patient information was obtained from patient.  History/Exam limitations: none.  Patient presented to the Emergency Department by private vehicle.    Chief Complaint   Laceration      Patient presents for evaluation of a laceration to the left knee. Injury occurred 20 minutes ago. The mechanism of the wound was a metal edge. The patient reports no numbness. There were no other injuries.  Patient denies head injury, loss of consciousness, numbness, and weakness. The tetanus status is unknown.          Review of patient's allergies indicates:  No Known Allergies  Past Medical History:   Diagnosis Date    ADHD (attention deficit hyperactivity disorder)      Past Surgical History:   Procedure Laterality Date    ABDOMINAL SURGERY      TYMPANOSTOMY TUBE PLACEMENT Bilateral      Family History   Problem Relation Name Age of Onset    Hypertension Mother      Liver disease Mother          fatty liver    Arthritis Mother      Hyperlipidemia Father      No Known Problems Sister      No Known Problems Brother      Diabetes Maternal Grandmother      Stroke Maternal Grandmother      Hypertension Maternal Grandmother      Asthma Maternal Grandmother      Heart attack Maternal Grandfather      Hypertension Maternal Grandfather      No Known Problems Paternal Grandmother      No Known Problems Paternal Grandfather       Social History[1]  Review of Systems   Constitutional:  Negative for fever.   HENT:  Negative for sore throat.    Respiratory:  Negative for shortness of breath.    Cardiovascular:  Negative for chest pain.   Gastrointestinal:  Negative for nausea.   Genitourinary:  Negative for dysuria.   Musculoskeletal:  Negative for back pain.   Skin:  Positive for wound. Negative for rash.   Neurological:  Negative for weakness.   Hematological:  Does not bruise/bleed  easily.       Physical Exam     Initial Vitals [02/24/25 1504]   BP Pulse Resp Temp SpO2   (!) 147/73 107 (!) 22 97.8 °F (36.6 °C) 99 %      MAP       --         Physical Exam    Vitals reviewed.  Constitutional: Vital signs are normal. He appears well-developed and well-nourished. He is cooperative.   HENT:   Head: Normocephalic and atraumatic.   Right Ear: Hearing normal.   Left Ear: Hearing normal.   Nose: Nose normal. Mouth/Throat: Mucous membranes are normal.   Eyes: Conjunctivae and lids are normal. Right eye exhibits no nystagmus. Left eye exhibits no nystagmus.   Neck: Trachea normal.   Normal range of motion.  Cardiovascular:  Normal rate and regular rhythm.           Musculoskeletal:      Cervical back: Normal range of motion.      Left knee: Laceration (see images below) present.        Legs:      Neurological: He is alert and oriented to person, place, and time. Gait normal.   Skin: Skin is warm.   Psychiatric: He has a normal mood and affect. His speech is normal and behavior is normal. Judgment and thought content normal. Cognition and memory are normal.               Medical Screening Exam   See Full Note    ED Course   Lac Repair    Date/Time: 2/24/2025 5:08 PM    Performed by: Josefa Scott FNP  Authorized by: Josefa Scott FNP    Consent:     Consent obtained:  Verbal    Consent given by:  Patient    Risks, benefits, and alternatives were discussed: yes      Risks discussed:  Need for additional repair    Alternatives discussed:  Delayed treatment  Universal protocol:     Patient identity confirmed:  Verbally with patient, hospital-assigned identification number and provided demographic data  Anesthesia:     Anesthesia method:  Local infiltration    Local anesthetic:  Lidocaine 1% w/o epi  Laceration details:     Location:  Leg    Leg location:  L knee    Length (cm):  6  Pre-procedure details:     Preparation:  Patient was prepped and draped in usual sterile fashion  Exploration:      Limited defect created (wound extended): no      Hemostasis achieved with:  Direct pressure    Imaging obtained: x-ray      Imaging outcome: foreign body not noted      Wound exploration: wound explored through full range of motion and entire depth of wound visualized      Wound extent: areolar tissue violated      Contaminated: no    Treatment:     Area cleansed with:  Povidone-iodine and saline    Amount of cleaning:  Extensive    Irrigation solution:  Sterile saline    Irrigation method:  Syringe    Visualized foreign bodies/material removed: no      Debridement:  None    Undermining:  None    Layers/structures repaired:  Galea  Galea:     Suture size:  4-0    Suture material:  Monocryl    Suture technique:  Simple interrupted    Number of sutures:  6  Skin repair:     Repair method:  Tissue adhesive  Approximation:     Approximation:  Loose  Repair type:     Repair type:  Intermediate  Post-procedure details:     Dressing:  Open (no dressing)    Procedure completion:  Tolerated well, no immediate complications    Labs Reviewed - No data to display       Imaging Results              X-Ray Knee 3 View Left (Final result)  Result time 02/24/25 16:08:48      Final result by Kaiden Harry MD (02/24/25 16:08:48)                   Impression:      Negative for acute fracture or radiopaque foreign body.      Electronically signed by: Kaiden Harry  Date:    02/24/2025  Time:    16:08               Narrative:    EXAMINATION:  XR KNEE 3 VIEW LEFT    CLINICAL HISTORY:  Laceration without foreign body, left knee, initial encounter    FINDINGS:  Four views of the left knee show no acute fracture, dislocation or destructive osseous lesion. The joint spaces are preserved. Bone mineralization is normal, with no evidence of a joint effusion or radiopaque foreign body.                                       Medications   Tdap vaccine injection 0.5 mL (0.5 mLs Intramuscular Given 2/24/25 5838)   morphine injection 4 mg (4 mg  Intramuscular Given 2/24/25 1600)   ondansetron injection 4 mg (4 mg Intramuscular Given 2/24/25 1600)   LIDOcaine HCL 10 mg/ml (1%) injection 10 mL (10 mLs Infiltration Given 2/24/25 1600)     Medical Decision Making  Patient had a laceration of his left knee that was repaired in the ED after copious irrigation. After exploration of the wound, there was no evidence of a retained foreign body. No evidence of underlying fracture. TDAP: given during visit      Problems Addressed:  Laceration of left knee: acute illness or injury     Details: Discharge. Patient has been given strict wound return precautions and instructions to follow up in ED in 2 days for a wound recheck. Anaphylactic antibiotic therapy prescribed.     Amount and/or Complexity of Data Reviewed  Radiology: ordered.    Risk  Prescription drug management.  Risk Details: Patient is not from SNF, chronically ill or immunosuppressed therefore he is low risk for morbidity/mortality.          Clinical Impression:   Final diagnoses:  [S81.012A] Laceration of left knee, initial encounter (Primary)  [S81.012A] Laceration of left knee            [1]   Social History  Tobacco Use    Smoking status: Every Day     Types: Cigarettes    Smokeless tobacco: Former     Types: Snuff     Quit date: 08/2022    Tobacco comments:     every now and then   Substance Use Topics    Alcohol use: Yes     Comment: weekends    Drug use: Never        Josefa Scott, P  02/25/25 0110

## 2025-02-24 NOTE — ED TRIAGE NOTES
WAS IN THE WOODS AND CUT HIS LEFT KNEE WITH CHAIN SAW. AMBULATORY WITH VERY LITTLE BLEEDING NOTED 6CM X 1.5CM  
288

## 2025-02-26 ENCOUNTER — HOSPITAL ENCOUNTER (EMERGENCY)
Facility: HOSPITAL | Age: 25
Discharge: HOME | End: 2025-02-26
Payer: COMMERCIAL

## 2025-02-26 VITALS
SYSTOLIC BLOOD PRESSURE: 144 MMHG | RESPIRATION RATE: 18 BRPM | DIASTOLIC BLOOD PRESSURE: 74 MMHG | HEIGHT: 67 IN | WEIGHT: 210 LBS | BODY MASS INDEX: 32.96 KG/M2 | HEART RATE: 87 BPM | TEMPERATURE: 98 F | OXYGEN SATURATION: 98 %

## 2025-02-26 DIAGNOSIS — Z51.89 VISIT FOR WOUND CHECK: Primary | ICD-10-CM

## 2025-02-26 PROCEDURE — 99999 HC NO LEVEL OF SERVICE - ED ONLY: CPT

## 2025-02-26 PROCEDURE — 99499 UNLISTED E&M SERVICE: CPT | Mod: GF,,, | Performed by: NURSE PRACTITIONER

## 2025-02-26 NOTE — DISCHARGE INSTRUCTIONS
WASH TWICE A DAY WITH DIAL SOAP AND WATER. KEEP OPEN TO AIR AS MUCH AS POSSIBLE. KEEP COVERED IF AT WORK OR POSSIBILITY OF GETTING DIRTY. MAY APPLY A VERY THIN LAYER OF BACITRACIN DAILY TO SUTURE LINE IF COVERED.  COME BACK ON 03/10/25 FOR SUTURE REMOVAL.   Tylenol and Ibuprofen as needed for pain.   Return to the ED for worsening signs and symptoms or otherwise as needed.

## 2025-02-26 NOTE — Clinical Note
"Arnie Cuevas" Tyler was seen and treated in our emergency department on 2/26/2025.  He may return with limitations on 02/27/2025.  Light duty for the next two days 02/27 and 02/28. Then return to regular activities or follow up with primary care provider if needed     Sincerely,      Charleen Santos FNP    "

## 2025-03-03 NOTE — ED PROVIDER NOTES
Encounter Date: 2/26/2025       History     Chief Complaint   Patient presents with    Wound Check     The history is provided by the patient.   Wound Check   He was treated in the ED 2 to 3 days ago. Previous treatment in the ED includes Laceration repair and wound cleansing or irrigation. Treatments since wound repair include antibiotic ointment use and regular soap and water washings. There has been clear discharge from the wound. There is no redness present. There is no swelling present. The pain has improved. He has no difficulty moving the affected extremity or digit.     Review of patient's allergies indicates:  No Known Allergies  Past Medical History:   Diagnosis Date    ADHD (attention deficit hyperactivity disorder)      Past Surgical History:   Procedure Laterality Date    ABDOMINAL SURGERY      TYMPANOSTOMY TUBE PLACEMENT Bilateral      Family History   Problem Relation Name Age of Onset    Hypertension Mother      Liver disease Mother          fatty liver    Arthritis Mother      Hyperlipidemia Father      No Known Problems Sister      No Known Problems Brother      Diabetes Maternal Grandmother      Stroke Maternal Grandmother      Hypertension Maternal Grandmother      Asthma Maternal Grandmother      Heart attack Maternal Grandfather      Hypertension Maternal Grandfather      No Known Problems Paternal Grandmother      No Known Problems Paternal Grandfather       Social History[1]  Review of Systems   All other systems reviewed and are negative.      Physical Exam     Initial Vitals [02/26/25 1500]   BP Pulse Resp Temp SpO2   (!) 144/74 87 18 98 °F (36.7 °C) 98 %      MAP       --         Physical Exam    Constitutional: He appears well-developed and well-nourished. He is cooperative.   Musculoskeletal:      Left knee: No swelling, deformity, effusion, erythema or bony tenderness. Normal range of motion. Normal pulse.      Comments: NVI distally     Neurological: He is alert and oriented to person,  place, and time. He has normal strength. GCS eye subscore is 4. GCS verbal subscore is 5. GCS motor subscore is 6.   Skin: Skin is warm and dry. Capillary refill takes less than 2 seconds.   Laceration appears to be healing well to left knee. There is small amount of serous drainage but otherwise no erythema, warmth or swelling         Medical Screening Exam   See Full Note    ED Course   Procedures  Labs Reviewed - No data to display       Imaging Results    None          Medications - No data to display  Medical Decision Making  The history is provided by the patient.   Wound Check   He was treated in the ED 2 to 3 days ago. Previous treatment in the ED includes Laceration repair and wound cleansing or irrigation. Treatments since wound repair include antibiotic ointment use and regular soap and water washings. There has been clear discharge from the wound. There is no redness present. There is no swelling present. The pain has improved. He has no difficulty moving the affected extremity or digit.       Problems Addressed:  Visit for wound check:     Details: Afebrile; mild amount of serious drainage. Reports compliance with abx and wound care. Concerned may have issues at work d/t a lot of walking and heavy lifting. Counseled on supportive measures. Follow up instructions given. Warning s/s discussed and return precautions given; the patient has v/u.                                        Clinical Impression:   Final diagnoses:  [Z51.89] Visit for wound check (Primary)        ED Disposition Condition    Discharge Stable          ED Prescriptions    None       Follow-up Information       Follow up With Specialties Details Why Contact Info    [facility] Department Emergency Medicine On 3/10/2025 For suture removal [address]  [facility] [address] [phone]               [1]   Social History  Tobacco Use    Smoking status: Every Day     Types: Cigarettes    Smokeless tobacco:  Former     Types: Snuff     Quit date: 08/2022    Tobacco comments:     every now and then   Substance Use Topics    Alcohol use: Yes     Comment: weekends    Drug use: Never        Charleen Santos FNP  03/02/25 1915